# Patient Record
Sex: FEMALE | Race: BLACK OR AFRICAN AMERICAN | NOT HISPANIC OR LATINO | Employment: OTHER | URBAN - METROPOLITAN AREA
[De-identification: names, ages, dates, MRNs, and addresses within clinical notes are randomized per-mention and may not be internally consistent; named-entity substitution may affect disease eponyms.]

---

## 2017-03-14 PROBLEM — Z79.60 LONG-TERM USE OF IMMUNOSUPPRESSANT MEDICATION: Status: ACTIVE | Noted: 2017-03-14

## 2017-03-14 PROBLEM — D84.9 IMMUNOSUPPRESSION: Status: ACTIVE | Noted: 2017-03-14

## 2017-06-01 PROBLEM — E11.9 TYPE 2 DIABETES MELLITUS WITHOUT COMPLICATION: Status: ACTIVE | Noted: 2017-06-01

## 2017-09-19 ENCOUNTER — TELEPHONE (OUTPATIENT)
Dept: PHARMACY | Facility: CLINIC | Age: 67
End: 2017-09-19

## 2017-09-19 PROBLEM — Z91.199 NONCOMPLIANCE: Status: ACTIVE | Noted: 2017-09-19

## 2017-09-21 NOTE — TELEPHONE ENCOUNTER
Confirmed readiness to receive first Humira package. Shipping out 9/26/17. Address confirmed. Copay $0 (004). Declined consult. Has been on medication for a long time. Doses every other Sunday. Last dose 9/17/17.

## 2017-10-11 PROBLEM — K50.90 CROHN'S DISEASE: Status: ACTIVE | Noted: 2017-10-11

## 2017-10-19 ENCOUNTER — TELEPHONE (OUTPATIENT)
Dept: PHARMACY | Facility: CLINIC | Age: 67
End: 2017-10-19

## 2017-11-08 ENCOUNTER — TELEPHONE (OUTPATIENT)
Dept: PHARMACY | Facility: CLINIC | Age: 67
End: 2017-11-08

## 2017-12-06 ENCOUNTER — TELEPHONE (OUTPATIENT)
Dept: PHARMACY | Facility: CLINIC | Age: 67
End: 2017-12-06

## 2018-11-29 ENCOUNTER — TELEPHONE (OUTPATIENT)
Dept: ADMINISTRATIVE | Facility: HOSPITAL | Age: 68
End: 2018-11-29

## 2018-12-19 PROBLEM — N95.1 MENOPAUSAL VAGINAL DRYNESS: Status: ACTIVE | Noted: 2018-12-19

## 2018-12-21 ENCOUNTER — TELEPHONE (OUTPATIENT)
Dept: PHARMACY | Facility: CLINIC | Age: 68
End: 2018-12-21

## 2018-12-24 NOTE — TELEPHONE ENCOUNTER
DOCUMENTATION ONLY:   Humira prior authorization approved x 1 year  Dates: 12/24/18 through 12/31/19  Case ID: [id number]  Co pay: $0

## 2018-12-24 NOTE — TELEPHONE ENCOUNTER
Humira refill $0 (004). Continuation of treatment - declined consult. Shipping out 12/26/18. Address confirmed.   Confirmed no new pt supplies needed.

## 2019-01-30 ENCOUNTER — TELEPHONE (OUTPATIENT)
Dept: PHARMACY | Facility: CLINIC | Age: 69
End: 2019-01-30

## 2019-01-30 NOTE — TELEPHONE ENCOUNTER
Patient returned phone call back regard specialty medication refill for Humira 40mg/0.8mL $3.80/004- Patient scheduled to have medication ship out on Wed 2/6 to arrive on Thurs 2/7 address confirmed & provided NEW credit card information to be put on file ([id number]). Patient informed no new medications, conditions or allergies since last talked to [facility]. Patient have no injection remaining & no missed dose. Patient informed she will need the injection again in about 2 weeks just used the last injection on hand today. Patient declined questions for the clinical pharmacist. Patient voiced understanding.

## 2019-02-28 ENCOUNTER — TELEPHONE (OUTPATIENT)
Dept: PHARMACY | Facility: CLINIC | Age: 69
End: 2019-02-28

## 2019-04-04 ENCOUNTER — PATIENT MESSAGE (OUTPATIENT)
Dept: PHARMACY | Facility: CLINIC | Age: 69
End: 2019-04-04

## 2019-04-04 ENCOUNTER — TELEPHONE (OUTPATIENT)
Dept: PHARMACY | Facility: CLINIC | Age: 69
End: 2019-04-04

## 2019-05-03 ENCOUNTER — TELEPHONE (OUTPATIENT)
Dept: PHARMACY | Facility: CLINIC | Age: 69
End: 2019-05-03

## 2019-05-28 ENCOUNTER — TELEPHONE (OUTPATIENT)
Dept: PHARMACY | Facility: CLINIC | Age: 69
End: 2019-05-28

## 2019-07-24 ENCOUNTER — TELEPHONE (OUTPATIENT)
Dept: PHARMACY | Facility: CLINIC | Age: 69
End: 2019-07-24

## 2019-08-30 ENCOUNTER — PATIENT MESSAGE (OUTPATIENT)
Dept: PHARMACY | Facility: CLINIC | Age: 69
End: 2019-08-30

## 2019-09-12 ENCOUNTER — TELEPHONE (OUTPATIENT)
Dept: PHARMACY | Facility: CLINIC | Age: 69
End: 2019-09-12

## 2019-09-17 PROBLEM — K50.00 CROHN'S DISEASE OF SMALL INTESTINE WITHOUT COMPLICATION: Status: ACTIVE | Noted: 2017-10-11

## 2019-10-09 ENCOUNTER — TELEPHONE (OUTPATIENT)
Dept: PHARMACY | Facility: CLINIC | Age: 69
End: 2019-10-09

## 2019-10-09 ENCOUNTER — PATIENT MESSAGE (OUTPATIENT)
Dept: PHARMACY | Facility: CLINIC | Age: 69
End: 2019-10-09

## 2019-10-15 NOTE — TELEPHONE ENCOUNTER
Called pt 10/15 for Humira refill. Unable to leave message (mailbox full). Mychart message sent on 10/9 has not been read by patient. ($0 copay in 004)  Intervention - review medication list with pt, may have changes made during visit with PCP

## 2019-10-24 NOTE — TELEPHONE ENCOUNTER
Pt confirmed shipping of Humira on 10/24 to arrive on 10/25. Address and  verified. $0 copay in 004. Pt is not in need of a new sharps container. Pt has 0 doses on hand, next dose due 10/26. Pt reported no missed doses. Pt did not start any new medications. Included calendar with marked injection days in shipment for pt since she has trouble remembering when her dose is due. Pt had no further questions or concerns.

## 2019-11-15 ENCOUNTER — TELEPHONE (OUTPATIENT)
Dept: PHARMACY | Facility: CLINIC | Age: 69
End: 2019-11-15

## 2019-11-21 ENCOUNTER — TELEPHONE (OUTPATIENT)
Dept: PHARMACY | Facility: CLINIC | Age: 69
End: 2019-11-21

## 2019-11-21 NOTE — TELEPHONE ENCOUNTER
Called patient on 11/21 for Humira refill/follow-up. Someone answered and informed patient was unavailable. [facility] will call back. $0 copay in 004

## 2019-11-30 NOTE — TELEPHONE ENCOUNTER
Clinical follow-up conducted for Humira. Name/ confirmed. no missed doses; no new medications; no side effects noted. Sometimes legs and knees hurt when doing leisure activities but hardly any difficulty. She stated that with the weather changes now, she is staying indoors so she doesn't catch anything and tries to stay close to home. She doesn't go to any social events - weddings, funerals, etc, because so she doesn't have an accident on herself because she doesn't know where the bathroom is at strange places. Reports not wanting to be smelling around other people and have accidents on herself. Counseled patient on foods to avoid that may irritate her bowel problem (raw green vegetables, spicy foods, alcohol, caffeine, etc). Patient appreciated it and would like the list sent to her via [employer] message. 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 Patient understands to report any medication changes to [facility] and provider. All questions answered and addressed to patients satisfaction.      Refill readiness for Humira confirmed with patient; name/ confirmed; no missed doses; no new medications; no side effects noted; address confirmed for  shipment and 12/3 delivery. $0 copay. Patient states she has 0 doses remaining. No sharps container needed    Riddhi Sanchez, Pharm.D.  Pharmacy Resident, PGY-1   [facility]

## 2019-12-16 ENCOUNTER — TELEPHONE (OUTPATIENT)
Dept: PHARMACY | Facility: CLINIC | Age: 69
End: 2019-12-16

## 2019-12-16 NOTE — TELEPHONE ENCOUNTER
Patient reached- informed that refill request was sent to [facility]. Patient stated that she is due for her next dose on 12/28. Will contact patient once refill is approved.

## 2019-12-23 NOTE — TELEPHONE ENCOUNTER
RX call attempt 1 regarding Humira from [facility]. Patient reached -- stated that she had a pen remaining for her 12/28 injection. I informed the patient that we would give her a call  to her next injection (1/11) to schedule her next shipment.

## 2020-01-06 ENCOUNTER — TELEPHONE (OUTPATIENT)
Dept: PHARMACY | Facility: CLINIC | Age: 70
End: 2020-01-06

## 2020-02-03 ENCOUNTER — TELEPHONE (OUTPATIENT)
Dept: PHARMACY | Facility: CLINIC | Age: 70
End: 2020-02-03

## 2020-02-03 NOTE — TELEPHONE ENCOUNTER
Refill readiness for Humira confirmed with patient; name/ confirmed; no missed doses; no new medications; no side effects noted; address confirmed for  shipment and  delivery. $3.90 copay. [facility] [id number]. Patient states she has 0 doses remaining and injected her last pen on . She needs her next dose by 2/15. No sharps container needed but patient requested calendar marked with injection dates    Riddhi Sanchez, Pharm.D.  Pharmacy Resident, PGY-1   [facility]

## 2020-03-19 ENCOUNTER — TELEPHONE (OUTPATIENT)
Dept: PHARMACY | Facility: CLINIC | Age: 70
End: 2020-03-19

## 2020-03-19 NOTE — TELEPHONE ENCOUNTER
Confirmed shipment Humira 3/23 to arrive to patient home 3/24. Patient stated no doses on hand and no missed doses in the past month. She reported that her next dose due 3/25. Address and  verified. $0 copay (004).

## 2020-06-08 ENCOUNTER — TELEPHONE (OUTPATIENT)
Dept: PHARMACY | Facility: CLINIC | Age: 70
End: 2020-06-08

## 2020-06-08 NOTE — TELEPHONE ENCOUNTER
RX call attempt #1 regarding HUMIRA refill from [facility]. Patient was now feeling well and ask [facility] to call back, will pend refill.  My chart msg sent  Copay $0

## 2020-06-11 ENCOUNTER — TELEPHONE (OUTPATIENT)
Dept: PHARMACY | Facility: CLINIC | Age: 70
End: 2020-06-11

## 2020-07-09 ENCOUNTER — TELEPHONE (OUTPATIENT)
Dept: PHARMACY | Facility: CLINIC | Age: 70
End: 2020-07-09

## 2020-12-11 ENCOUNTER — TELEPHONE (OUTPATIENT)
Dept: ADMINISTRATIVE | Facility: HOSPITAL | Age: 70
End: 2020-12-11

## 2021-05-06 ENCOUNTER — PATIENT MESSAGE (OUTPATIENT)
Dept: RESEARCH | Facility: HOSPITAL | Age: 71
End: 2021-05-06